# Patient Record
Sex: FEMALE
[De-identification: names, ages, dates, MRNs, and addresses within clinical notes are randomized per-mention and may not be internally consistent; named-entity substitution may affect disease eponyms.]

---

## 2018-01-11 PROBLEM — Z00.00 ENCOUNTER FOR PREVENTIVE HEALTH EXAMINATION: Status: ACTIVE | Noted: 2018-01-11

## 2018-02-03 ENCOUNTER — APPOINTMENT (OUTPATIENT)
Dept: OPHTHALMOLOGY | Facility: CLINIC | Age: 33
End: 2018-02-03

## 2018-02-17 ENCOUNTER — APPOINTMENT (OUTPATIENT)
Dept: OPHTHALMOLOGY | Facility: CLINIC | Age: 33
End: 2018-02-17
Payer: MEDICAID

## 2018-02-17 PROCEDURE — 92004 COMPRE OPH EXAM NEW PT 1/>: CPT

## 2018-03-03 ENCOUNTER — APPOINTMENT (OUTPATIENT)
Dept: OPHTHALMOLOGY | Facility: CLINIC | Age: 33
End: 2018-03-03

## 2018-03-31 ENCOUNTER — APPOINTMENT (OUTPATIENT)
Dept: OPHTHALMOLOGY | Facility: CLINIC | Age: 33
End: 2018-03-31
Payer: MEDICAID

## 2018-03-31 PROCEDURE — 92012 INTRM OPH EXAM EST PATIENT: CPT

## 2018-03-31 PROCEDURE — 92083 EXTENDED VISUAL FIELD XM: CPT

## 2019-04-25 ENCOUNTER — APPOINTMENT (OUTPATIENT)
Dept: OPHTHALMOLOGY | Facility: CLINIC | Age: 34
End: 2019-04-25

## 2020-08-31 ENCOUNTER — EMERGENCY (EMERGENCY)
Facility: HOSPITAL | Age: 35
LOS: 1 days | Discharge: HOME | End: 2020-08-31
Attending: EMERGENCY MEDICINE
Payer: COMMERCIAL

## 2020-08-31 VITALS
DIASTOLIC BLOOD PRESSURE: 85 MMHG | OXYGEN SATURATION: 98 % | HEIGHT: 65 IN | WEIGHT: 190.92 LBS | HEART RATE: 76 BPM | TEMPERATURE: 98 F | RESPIRATION RATE: 18 BRPM | SYSTOLIC BLOOD PRESSURE: 129 MMHG

## 2020-08-31 LAB
ALBUMIN SERPL ELPH-MCNC: 4.5 G/DL — SIGNIFICANT CHANGE UP (ref 3.3–5)
ALP SERPL-CCNC: 88 U/L — SIGNIFICANT CHANGE UP (ref 40–120)
ALT FLD-CCNC: 8 U/L — LOW (ref 10–45)
ANION GAP SERPL CALC-SCNC: 12 MMOL/L — SIGNIFICANT CHANGE UP (ref 5–17)
APTT BLD: 32.8 SEC — SIGNIFICANT CHANGE UP (ref 27.5–35.5)
AST SERPL-CCNC: 14 U/L — SIGNIFICANT CHANGE UP (ref 10–40)
BASOPHILS # BLD AUTO: 0.04 K/UL — SIGNIFICANT CHANGE UP (ref 0–0.2)
BASOPHILS NFR BLD AUTO: 0.5 % — SIGNIFICANT CHANGE UP (ref 0–2)
BILIRUB SERPL-MCNC: 0.3 MG/DL — SIGNIFICANT CHANGE UP (ref 0.2–1.2)
BLD GP AB SCN SERPL QL: NEGATIVE — SIGNIFICANT CHANGE UP
BUN SERPL-MCNC: 15 MG/DL — SIGNIFICANT CHANGE UP (ref 7–23)
CALCIUM SERPL-MCNC: 9.6 MG/DL — SIGNIFICANT CHANGE UP (ref 8.4–10.5)
CHLORIDE SERPL-SCNC: 104 MMOL/L — SIGNIFICANT CHANGE UP (ref 96–108)
CO2 SERPL-SCNC: 24 MMOL/L — SIGNIFICANT CHANGE UP (ref 22–31)
CREAT SERPL-MCNC: 0.75 MG/DL — SIGNIFICANT CHANGE UP (ref 0.5–1.3)
EOSINOPHIL # BLD AUTO: 0.17 K/UL — SIGNIFICANT CHANGE UP (ref 0–0.5)
EOSINOPHIL NFR BLD AUTO: 2.2 % — SIGNIFICANT CHANGE UP (ref 0–6)
GAS PNL BLDV: SIGNIFICANT CHANGE UP
GLUCOSE SERPL-MCNC: 96 MG/DL — SIGNIFICANT CHANGE UP (ref 70–99)
HCG SERPL-ACNC: <2 MIU/ML — SIGNIFICANT CHANGE UP
HCT VFR BLD CALC: 41.9 % — SIGNIFICANT CHANGE UP (ref 34.5–45)
HGB BLD-MCNC: 14 G/DL — SIGNIFICANT CHANGE UP (ref 11.5–15.5)
IMM GRANULOCYTES NFR BLD AUTO: 0.3 % — SIGNIFICANT CHANGE UP (ref 0–1.5)
INR BLD: 1.06 RATIO — SIGNIFICANT CHANGE UP (ref 0.88–1.16)
LYMPHOCYTES # BLD AUTO: 2.34 K/UL — SIGNIFICANT CHANGE UP (ref 1–3.3)
LYMPHOCYTES # BLD AUTO: 29.8 % — SIGNIFICANT CHANGE UP (ref 13–44)
MCHC RBC-ENTMCNC: 29.9 PG — SIGNIFICANT CHANGE UP (ref 27–34)
MCHC RBC-ENTMCNC: 33.4 GM/DL — SIGNIFICANT CHANGE UP (ref 32–36)
MCV RBC AUTO: 89.3 FL — SIGNIFICANT CHANGE UP (ref 80–100)
MONOCYTES # BLD AUTO: 0.85 K/UL — SIGNIFICANT CHANGE UP (ref 0–0.9)
MONOCYTES NFR BLD AUTO: 10.8 % — SIGNIFICANT CHANGE UP (ref 2–14)
NEUTROPHILS # BLD AUTO: 4.43 K/UL — SIGNIFICANT CHANGE UP (ref 1.8–7.4)
NEUTROPHILS NFR BLD AUTO: 56.4 % — SIGNIFICANT CHANGE UP (ref 43–77)
NRBC # BLD: 0 /100 WBCS — SIGNIFICANT CHANGE UP (ref 0–0)
PLATELET # BLD AUTO: 181 K/UL — SIGNIFICANT CHANGE UP (ref 150–400)
POTASSIUM SERPL-MCNC: 3.6 MMOL/L — SIGNIFICANT CHANGE UP (ref 3.5–5.3)
POTASSIUM SERPL-SCNC: 3.6 MMOL/L — SIGNIFICANT CHANGE UP (ref 3.5–5.3)
PROT SERPL-MCNC: 7.2 G/DL — SIGNIFICANT CHANGE UP (ref 6–8.3)
PROTHROM AB SERPL-ACNC: 12.6 SEC — SIGNIFICANT CHANGE UP (ref 10.6–13.6)
RBC # BLD: 4.69 M/UL — SIGNIFICANT CHANGE UP (ref 3.8–5.2)
RBC # FLD: 13 % — SIGNIFICANT CHANGE UP (ref 10.3–14.5)
RH IG SCN BLD-IMP: POSITIVE — SIGNIFICANT CHANGE UP
RH IG SCN BLD-IMP: POSITIVE — SIGNIFICANT CHANGE UP
SODIUM SERPL-SCNC: 140 MMOL/L — SIGNIFICANT CHANGE UP (ref 135–145)
WBC # BLD: 7.85 K/UL — SIGNIFICANT CHANGE UP (ref 3.8–10.5)
WBC # FLD AUTO: 7.85 K/UL — SIGNIFICANT CHANGE UP (ref 3.8–10.5)

## 2020-08-31 PROCEDURE — 99285 EMERGENCY DEPT VISIT HI MDM: CPT

## 2020-08-31 PROCEDURE — 74177 CT ABD & PELVIS W/CONTRAST: CPT | Mod: 26

## 2020-08-31 NOTE — ED PROVIDER NOTE - PHYSICAL EXAMINATION
GENERAL: non-toxic appearing, in NAD  HEAD: atraumatic, normocephalic  EYES: vision grossly intact, no conjunctivitis or discharge  EARS: hearing grossly intact  NOSE: no nasal discharge, epistaxis   CARDIAC: RRR, normal S1S2,  no appreciable murmurs, no cyanosis, cap refill < 2 seconds  PULM: no respiratory distress, oxygen saturation on RA wnl, CTAB, no crackles, rales, rhonchi, or wheezing  GI: abdomen nondistended, soft, nontender, no guarding or rebound tenderness, palpable mass midline over suprapubic region  NEURO: awake and alert, follow commands, normal speech, PERRLA, EOMI, no focal motor or sensory deficits, normal gait  MSK: spine appears normal, no joint swelling or erythema, no gross deformities of extremities  EXT: no peripheral edema, calf tenderness, redness or swelling  SKIN: warm, dry, and intact, no rashes  PSYCH: appropriate mood and affect

## 2020-08-31 NOTE — ED PROVIDER NOTE - OBJECTIVE STATEMENT
34F with hx of breast augmentation presenting with suprapubic/RLQ pain x 1 week. No fever, n/v/d. Is passing gas. Defecating normally. No urinary symptoms. No hx of ovarian pathology.

## 2020-08-31 NOTE — ED PROVIDER NOTE - PROGRESS NOTE DETAILS
Tong: CT showing Enlarged leiomyomatous uterus, incompletely characterized 8.4 x 4.0 cm cystic structure in the left adnexa. Patient reassessed. Has no left sided abdominal pain or ttp. Will obtain TVUS to elucidate left adnexal structure. tong: OB consulted. Tong: no evidence of torsion on the TVUS. Large cystic lesion within the left ovary on U/S. Safe for discharge with strict return precautions.

## 2020-08-31 NOTE — ED PROVIDER NOTE - ATTENDING CONTRIBUTION TO CARE
34F with hx of breast augmentation presenting with suprapubic/RLQ pain x 1 week with h/o constipation, fullness noted with h/o constipation, bloating but never had pain, CT a/p ordered, labs, deferred pain meds.

## 2020-08-31 NOTE — ED PROVIDER NOTE - NS ED ROS FT
GENERAL: no fever, chills, fatigue, weight loss, night sweats  HEENT: no eye pain, discharge, conjunctivitis, ear pain, hearing loss, rhinorrhea, congestion, throat pain  CARDIAC: no chest pain, palpitations, lightheadedness, syncope  PULM: no dyspnea, wheezing  GI: + abdominal pain  : no urinary dysuria, frequency, incontinence, hematuria  NEURO: no headache, changes in vision, motor weakness, sensory changes  MSK: no joint pain, joint swelling, myalgias  SKIN: no rashes  HEME: no active bleeding, excessive bruising

## 2020-08-31 NOTE — ED PROVIDER NOTE - NSFOLLOWUPINSTRUCTIONS_ED_ALL_ED_FT
Your diagnosis: uterine fibroid, ovarian cyst    Discharge instructions:    - Please follow up with an OB/GYN doctor.    - Be sure to return to the ED if you develop new or worsening symptoms. Specific signs and symptoms to be vigilant of: inability or difficulty urinating or having bowel movements, severe abdominal pain, flank or back pain, fever, chills, abnormal vaginal bleeding.

## 2020-08-31 NOTE — ED PROVIDER NOTE - NSFOLLOWUPCLINICS_GEN_ALL_ED_FT
[facility]ne[facility]stetrics  Gynceology/OB  [address] [place] [address]  Phone: [phone]  Fax:   Follow Up Time:

## 2020-08-31 NOTE — ED ADULT NURSE NOTE - OBJECTIVE STATEMENT
patient is a 34 year old female with no pertinent PMH who presents to the ED from home complaining of RLQ abd pain x 1 week that has been increasingly getting worse. patient describes the pain as 10/10 stabbing constant pain. patient is aaox3, lungs CTA bilaterally, abd soft, nondistended, tender upon palpation to RLQ. patient denies chest pain, shortness of breath, ha, dizziness, weakness, numbness or tingling, fever, chills, n/v/d, cough, back pain, changes in bowel or bladder, hematuria, bloody stool. patient resting on stretcher. IV placed. MD at bedside to assess. will continue to monitor.

## 2020-08-31 NOTE — ED PROVIDER NOTE - CLINICAL SUMMARY MEDICAL DECISION MAKING FREE TEXT BOX
34F with hx of breast augmentation presenting with suprapubic/RLQ pain x 1 week. On exam, appears comfortable, VS wnl, abdomen nondistended, soft, nontender, no guarding or rebound tenderness, palpable mass midline over suprapubic region. Low concern, but will eval for appendicitis vs obstruction from possible fibroids. Will check pre-ops, CT a/p. Patient declining pain meds at this time. Dispo pending work up.

## 2020-08-31 NOTE — ED ADULT NURSE NOTE - NSIMPLEMENTINTERV_GEN_ALL_ED
Implemented All Universal Safety Interventions:  [place] to call system. Call bell, personal items and telephone within reach. Instruct patient to call for assistance. Room bathroom lighting operational. Non-slip footwear when patient is off stretcher. Physically safe environment: no spills, clutter or unnecessary equipment. Stretcher in lowest position, wheels locked, appropriate side rails in place.

## 2020-09-01 VITALS
HEART RATE: 87 BPM | OXYGEN SATURATION: 98 % | RESPIRATION RATE: 18 BRPM | DIASTOLIC BLOOD PRESSURE: 67 MMHG | SYSTOLIC BLOOD PRESSURE: 107 MMHG

## 2020-09-01 LAB
APPEARANCE UR: CLEAR — SIGNIFICANT CHANGE UP
BACTERIA # UR AUTO: NEGATIVE — SIGNIFICANT CHANGE UP
BILIRUB UR-MCNC: NEGATIVE — SIGNIFICANT CHANGE UP
COLOR SPEC: SIGNIFICANT CHANGE UP
CULTURE RESULTS: SIGNIFICANT CHANGE UP
DIFF PNL FLD: ABNORMAL
EPI CELLS # UR: 2 /HPF — SIGNIFICANT CHANGE UP
GLUCOSE UR QL: NEGATIVE — SIGNIFICANT CHANGE UP
HYALINE CASTS # UR AUTO: 0 /LPF — SIGNIFICANT CHANGE UP (ref 0–7)
KETONES UR-MCNC: ABNORMAL
LEUKOCYTE ESTERASE UR-ACNC: NEGATIVE — SIGNIFICANT CHANGE UP
NITRITE UR-MCNC: NEGATIVE — SIGNIFICANT CHANGE UP
PH UR: 6 — SIGNIFICANT CHANGE UP (ref 5–8)
PROT UR-MCNC: NEGATIVE — SIGNIFICANT CHANGE UP
RBC CASTS # UR COMP ASSIST: 1 /HPF — SIGNIFICANT CHANGE UP (ref 0–4)
SP GR SPEC: 1.02 — SIGNIFICANT CHANGE UP (ref 1.01–1.02)
SPECIMEN SOURCE: SIGNIFICANT CHANGE UP
UROBILINOGEN FLD QL: NEGATIVE — SIGNIFICANT CHANGE UP
WBC UR QL: 2 /HPF — SIGNIFICANT CHANGE UP (ref 0–5)

## 2020-09-01 PROCEDURE — 85018 HEMOGLOBIN: CPT

## 2020-09-01 PROCEDURE — 83605 ASSAY OF LACTIC ACID: CPT

## 2020-09-01 PROCEDURE — 99244 OFF/OP CNSLTJ NEW/EST MOD 40: CPT

## 2020-09-01 PROCEDURE — 84295 ASSAY OF SERUM SODIUM: CPT

## 2020-09-01 PROCEDURE — 82435 ASSAY OF BLOOD CHLORIDE: CPT

## 2020-09-01 PROCEDURE — 82330 ASSAY OF CALCIUM: CPT

## 2020-09-01 PROCEDURE — 93975 VASCULAR STUDY: CPT | Mod: 26

## 2020-09-01 PROCEDURE — 76830 TRANSVAGINAL US NON-OB: CPT

## 2020-09-01 PROCEDURE — 74177 CT ABD & PELVIS W/CONTRAST: CPT

## 2020-09-01 PROCEDURE — 99284 EMERGENCY DEPT VISIT MOD MDM: CPT | Mod: 25

## 2020-09-01 PROCEDURE — 76856 US EXAM PELVIC COMPLETE: CPT

## 2020-09-01 PROCEDURE — 85027 COMPLETE CBC AUTOMATED: CPT

## 2020-09-01 PROCEDURE — 82947 ASSAY GLUCOSE BLOOD QUANT: CPT

## 2020-09-01 PROCEDURE — 81001 URINALYSIS AUTO W/SCOPE: CPT

## 2020-09-01 PROCEDURE — 84132 ASSAY OF SERUM POTASSIUM: CPT

## 2020-09-01 PROCEDURE — 87086 URINE CULTURE/COLONY COUNT: CPT

## 2020-09-01 PROCEDURE — 86900 BLOOD TYPING SEROLOGIC ABO: CPT

## 2020-09-01 PROCEDURE — 85610 PROTHROMBIN TIME: CPT

## 2020-09-01 PROCEDURE — 76830 TRANSVAGINAL US NON-OB: CPT | Mod: 26

## 2020-09-01 PROCEDURE — 93975 VASCULAR STUDY: CPT

## 2020-09-01 PROCEDURE — 84702 CHORIONIC GONADOTROPIN TEST: CPT

## 2020-09-01 PROCEDURE — 85730 THROMBOPLASTIN TIME PARTIAL: CPT

## 2020-09-01 PROCEDURE — 86901 BLOOD TYPING SEROLOGIC RH(D): CPT

## 2020-09-01 PROCEDURE — 76856 US EXAM PELVIC COMPLETE: CPT | Mod: 26,59

## 2020-09-01 PROCEDURE — 80053 COMPREHEN METABOLIC PANEL: CPT

## 2020-09-01 PROCEDURE — 82803 BLOOD GASES ANY COMBINATION: CPT

## 2020-09-01 PROCEDURE — 86850 RBC ANTIBODY SCREEN: CPT

## 2020-09-01 PROCEDURE — 85014 HEMATOCRIT: CPT

## 2020-09-01 NOTE — CONSULT NOTE ADULT - ASSESSMENT
35yo G0 with h/o HMB presenting with R lumbar discomfort. Abdominal and pelvic exam notable for fibroid uterus, otherwise benign; imaging documenting flow to b/l ovaries. Based on clinical and imaging, no concern for torsion.   - No acute GYN intervention required at this time  - Rec: follow-up with GYN for additional work-up of L adnexal findings, routine care; can refer to Dr. Cooley ([address] [place] [address], [phone])  - Reconsult as needed    d/w Dr. Camejo  [facility] R2  [id number]

## 2020-09-01 NOTE — CONSULT NOTE ADULT - ATTENDING COMMENTS
Agree with above  Pt with right sided pain but found to have left ovarian cyst.  Chart reviewed    VSS, afebrile    PE -   Ab - soft/nt/nd    Radiology results reviewed    A/P Pt with left ovarian cyst with no evidence of torsion on right side and left side.  Pt in stable condition.    Rec:  1) Pt cleared by Gyn for discharge  2) Pt to F/U as outpt for possible surgery on left ovary- pt to F/U with Dr. Cooley  3) Come back if any increased Ab pain, bleeding, N/V    N Gabbur

## 2020-09-01 NOTE — CONSULT NOTE ADULT - SUBJECTIVE AND OBJECTIVE BOX
R2 GYN CONSULT NOTE    33yo G0 LMP to start 2020 (2 days bleeding prior) with h/o HMB 2/2 ?fibroid uterus presenting to ED with c/o worsening R lumbar discomfort x1 week. Pt reports discomfort a constant dullness that she does not use medications for. However, yesterday her discomfort became more burning in nature and was radiating into her lower back. She describes the discomfort was "bloated on one side." She has not had similar discomfort in the past, however, believes that these symptoms may be 2/2 period to start. Denies CP, SOB, HA, nausea/vomiting, lightheadedness/dizziness, fevers/chills. +constipation at baseline (q2-3d bowel movements, last BM yesterday).     She reports having monthly periods with 3-4d of bleeding. She has 1d of heavy bleeding requiring super-plus tampon and pad, occasionally changing q20min, changes up-to 10x on heavy day, sometimes bleeding through. On days 1 and 3-4, she requires 2-4 pads throughout the day. She has not had any work-up or treatment of HMB 2/2 poor follow-up. She is sexually active with 1 partner last 4-5mo ago; reports 5 lifetime partners.     OB/GYN HISTORY:   G0  ?fibroids, no official dx  denies ov cysts, abnl Pap smears, STIs  OBGYN = no-one regularly, last Randy Carr 1.5yr ago    PMH: denies  PSH: breast augmentation (2008)  Meds: Biotin  Allg: minocycline (hives)  Soc: denies T/E/D  Psych: denies    FHx: denies uterine/ovarian/cervical/colon/prostate/pancreatic/breast cancers    REVIEW OF SYSTEMS:  CONSTITUTIONAL: No weakness, fevers or chills  RESPIRATORY: No cough, wheezing, hemoptysis; No shortness of breath  CARDIOVASCULAR: No chest pain or palpitations  GASTROINTESTINAL: +constipation; no abdominal or epigastric pain. No nausea, vomiting  GENITOURINARY: No dysuria, frequency or hematuria  All other review of systems is negative unless indicated above.    Vital Signs Last 24 Hrs  T(C): 36.8 (31 Aug 2020 23:00), Max: 36.9 (31 Aug 2020 20:02)  T(F): 98.2 (31 Aug 2020 23:00), Max: 98.4 (31 Aug 2020 20:02)  HR: 87 (01 Sep 2020 05:42) (72 - 87)  BP: 107/67 (01 Sep 2020 05:42) (107/67 - 130/78)  RR: 18 (01 Sep 2020 05:42) (17 - 18)  SpO2: 98% (01 Sep 2020 05:42) (98% - 99%)    PHYSICAL EXAM:  Gen: NAD, A&Ox3  Pulm: nonlabored breathing  Abd: soft, nontender, nondistended; no rebound/guarding  : NEFG  Speculum: scant blood in vault, no active bleeding from os   Bimanual: approx 16wk size uterus, fibroids appreciated in posterior cul-de-sac  Extremities: nontender, nonerythematous      LABS:                        14.0   7.85  )-----------( 181      ( 31 Aug 2020 22:28 )             41.9         140  |  104  |  15  ----------------------------<  96  3.6   |  24  |  0.75    Ca    9.6      31 Aug 2020 22:28    TPro  7.2  /  Alb  4.5  /  TBili  0.3  /  DBili  x   /  AST  14  /  ALT  8<L>  /  AlkPhos  88      PT/INR - ( 31 Aug 2020 22:28 )   PT: 12.6 sec;   INR: 1.06 ratio         PTT - ( 31 Aug 2020 22:28 )  PTT:32.8 sec  Urinalysis Basic - ( 31 Aug 2020 23:53 )    Color: Light Yellow / Appearance: Clear / S.019 / pH: x  Gluc: x / Ketone: Small  / Bili: Negative / Urobili: Negative   Blood: x / Protein: Negative / Nitrite: Negative   Leuk Esterase: Negative / RBC: 1 /hpf / WBC 2 /HPF   Sq Epi: x / Non Sq Epi: 2 /hpf / Bacteria: Negative    Blood Type: B Positive    RADIOLOGY & ADDITIONAL STUDIES:  < from: CT Abdomen and Pelvis w/ IV Cont (20 @ 23:45) >    EXAM:  CT ABDOMEN AND PELVIS IC                        PROCEDURE DATE:  2020    INTERPRETATION:  CLINICAL INFORMATION: Suprapubic/right lower quadrant pain for one week.    COMPARISON: None.    PROCEDURE:  CT of the Abdomen and Pelvis was performed with intravenous contrast.  Intravenous contrast: 90 ml Omnipaque 350. 10 ml discarded.  Oral contrast: None.  Sagittal and coronal reformats were performed.    FINDINGS:    LOWER CHEST: Partially imaged bilateral breast implants. The lung bases are clear.    LIVER: Within normal limits.  BILE DUCTS: Normal caliber.  GALLBLADDER: Within normal limits.  SPLEEN: Within normal limits.  PANCREAS: Within normal limits.  ADRENALS: Within normal limits.  KIDNEYS/URETERS: Within normal limits.    BLADDER: Diffusely thickened, difficult to assess secondary to inadequate distention.  REPRODUCTIVE ORGANS: Enlarged leiomyomatous uterus. Incompletely characterized 8.4 x 4.0 cm cystic structure in the left adnexa.    BOWEL: No bowel obstruction. Appendix is normal.  PERITONEUM: Trace pelvic free fluid.  VESSELS: Within normal limits.  RETROPERITONEUM/LYMPH NODES: No lymphadenopathy.  ABDOMINAL WALL: Within normal limits.  BONES: Within normal limits.    IMPRESSION:    Normal appendix.    Enlarged leiomyomatous uterus.    Incompletely characterized 8.4 x 4.0 cm cystic structure in the left adnexa. Findings are nonspecific, this may represent left ovarian cystic mass or hydrosalpinx. Patient remains at increased risk for ovarian torsion given size of the lesion. Pelvic ultrasound is recommended for further evaluation of these findings. Trace pelvic free fluid.    These results were discussed via telephone at 2020 2:11 AM by Dr. Griggs of radiology with Dr. Khan, institution read-back verification policy was followed.      RADHA MEZA M.D., RADIOLOGY RESIDENT  This document has been electronically signed.  DANIEL GRIGGS M.D., ATTENDING RADIOLOGIST  This document has been electronically signed. Sep  1 2020  2:13AM      < end of copied text >    < from: US Transvaginal (20 @ 04:59) >    EXAM:  US TRANSVAGINAL                        EXAM:  US PELVIC COMPLETE                        EXAM:  US DPLX PELVIC                          PROCEDURE DATE:  2020      INTERPRETATION:  CLINICAL INFORMATION: Right lower quadrant pain, left adnexal lesion seen on CT, assess for hydrosalpinx    LMP: 2020    COMPARISON: Correlation is made with CT abdomen and pelvis from 2020    TECHNIQUE:  Endovaginal and transabdominal pelvic sonogram. Color and Spectral Doppler was performed.    FINDINGS:    Uterus: Enlarged fibroid uterus measuring 15.0 x 6.9 x 9.7 cm. 3.2 x 2.6 x 2.0 cm posterior intramural fibroid. 9.1 x 5.9 x 7.3 cm right subserosal fibroid. 6.1 x 5.2 x 6.3 cm posterior intramural fibroid.  Endometrium: 6 mm.Within normal limits.    Right ovary: 3.4 x 2.2 x 2.2 cm. Within normal limits. Normal arterial and venous waveforms.  Left ovary: 9.3 x 6.5 x 5.5 cm. There is a 8.0 x 5.3 x 4.4 cm complex cystic lesion within the left ovary with layering echogenic material. There are multiple small rounded peripherally echogenic foci within this collection. Normal arterial and venous waveforms.    Fluid: None.    IMPRESSION:    Enlarged fibroid uterus.    Large cystic lesion within the left ovary with layering of echogenic material and multiple small rounded peripherally echogenic foci. Findings are nonspecific this may represent endometrioma, complex hemorrhagic cyst or dermoid. Consider nonemergent correlation with MR for better characterization.    No sonographic evidence of ovarian torsion at this time. Patient remains at increased risk for torsion given large size of lesion. Recommend clinical correlation and short-term follow-up imaging as indicated.    GERALDINE MATTHEW M.D., RADIOLOGY RESIDENT  This document has been electronically signed.  DANIEL GRIGGS M.D., ATTENDING RADIOLOGIST  This document has been electronically signed. Sep  1 2020  5:54AM    < end of copied text >

## 2021-02-04 ENCOUNTER — NON-APPOINTMENT (OUTPATIENT)
Age: 36
End: 2021-02-04

## 2021-02-04 ENCOUNTER — APPOINTMENT (OUTPATIENT)
Dept: OPHTHALMOLOGY | Facility: CLINIC | Age: 36
End: 2021-02-04
Payer: COMMERCIAL

## 2021-02-04 PROCEDURE — 92014 COMPRE OPH EXAM EST PT 1/>: CPT

## 2021-02-04 PROCEDURE — 99072 ADDL SUPL MATRL&STAF TM PHE: CPT

## 2021-02-24 ENCOUNTER — APPOINTMENT (OUTPATIENT)
Dept: OPHTHALMOLOGY | Facility: CLINIC | Age: 36
End: 2021-02-24

## 2022-09-28 ENCOUNTER — APPOINTMENT (OUTPATIENT)
Dept: OBGYN | Facility: CLINIC | Age: 37
End: 2022-09-28